# Patient Record
Sex: MALE | Race: WHITE | NOT HISPANIC OR LATINO | URBAN - METROPOLITAN AREA
[De-identification: names, ages, dates, MRNs, and addresses within clinical notes are randomized per-mention and may not be internally consistent; named-entity substitution may affect disease eponyms.]

---

## 2023-02-28 ENCOUNTER — APPOINTMENT (OUTPATIENT)
Dept: URBAN - METROPOLITAN AREA CLINIC 208 | Age: 33
Setting detail: DERMATOLOGY
End: 2023-03-03

## 2023-02-28 DIAGNOSIS — L29.1 PRURITUS SCROTI: ICD-10-CM

## 2023-02-28 PROCEDURE — OTHER PRESCRIPTION: OTHER

## 2023-02-28 PROCEDURE — OTHER ADDITIONAL NOTES: OTHER

## 2023-02-28 PROCEDURE — OTHER MIPS QUALITY: OTHER

## 2023-02-28 PROCEDURE — OTHER COUNSELING: OTHER

## 2023-02-28 PROCEDURE — 99204 OFFICE O/P NEW MOD 45 MIN: CPT

## 2023-02-28 RX ORDER — DOXYCYCLINE HYCLATE 100 MG/1
CAPSULE, GELATIN COATED ORAL
Qty: 60 | Refills: 0 | Status: ERX | COMMUNITY
Start: 2023-02-28

## 2023-02-28 RX ORDER — DOXEPIN HYDROCHLORIDE 10 MG/1
CAPSULE ORAL
Qty: 60 | Refills: 1 | Status: ERX | COMMUNITY
Start: 2023-02-28

## 2023-02-28 ASSESSMENT — LOCATION SIMPLE DESCRIPTION DERM: LOCATION SIMPLE: SCROTUM

## 2023-02-28 ASSESSMENT — LOCATION ZONE DERM: LOCATION ZONE: GENITALIA

## 2023-02-28 ASSESSMENT — LOCATION DETAILED DESCRIPTION DERM
LOCATION DETAILED: LEFT ANTERIOR SCROTUM
LOCATION DETAILED: RIGHT ANTERIOR SCROTUM

## 2023-02-28 NOTE — HPI: RASH
What Type Of Note Output Would You Prefer (Optional)?: Bullet Format
How Severe Is Your Rash?: mild
Is This A New Presentation, Or A Follow-Up?: Rash
Additional History: Pt states that he wore a new pair of shorts right before the rash started. Urgent care diagnosed it as jock itch, which Rx Terbinafine. Then Urologist put pt on Clotrimazole-Betamethasone. Have tried Ketoconazole and Hydrocortisone from another Dermatologist, which has caused another rash on inner thigh. Bx was done and confirm rash as eczema on the inner thigh and Rx Triamcinolone. None of these treatments have helped. PCP just Rx pt Nystatin powder but Pt hasn’t stated using because he wanted to wait to see us. Pt states that area gets worst when he sweats, so he stopped exercising. Pt states that he tries very hard to keep the area dry.Tried Triple paste has helped sensitivity but when stop using flares back up.

## 2023-02-28 NOTE — PROCEDURE: ADDITIONAL NOTES
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  Discussed topical compounded nerve medications.  Pt is having MRI and further workup for herniated disks in lower back, discussed that this could be the etiology for his symptoms.
Render Risk Assessment In Note?: no
Detail Level: Generalized

## 2023-03-30 ENCOUNTER — APPOINTMENT (OUTPATIENT)
Dept: URBAN - METROPOLITAN AREA CLINIC 208 | Age: 33
Setting detail: DERMATOLOGY
End: 2023-04-03

## 2023-03-30 DIAGNOSIS — L29.1 PRURITUS SCROTI: ICD-10-CM

## 2023-03-30 PROCEDURE — OTHER PRESCRIPTION MEDICATION MANAGEMENT: OTHER

## 2023-03-30 PROCEDURE — OTHER PRESCRIPTION: OTHER

## 2023-03-30 PROCEDURE — OTHER MIPS QUALITY: OTHER

## 2023-03-30 PROCEDURE — 99214 OFFICE O/P EST MOD 30 MIN: CPT

## 2023-03-30 PROCEDURE — OTHER COUNSELING: OTHER

## 2023-03-30 PROCEDURE — OTHER ADDITIONAL NOTES: OTHER

## 2023-03-30 RX ORDER — TACROLIMUS 1 MG/G
OINTMENT TOPICAL
Qty: 30 | Refills: 0 | Status: ERX | COMMUNITY
Start: 2023-03-30

## 2023-03-30 ASSESSMENT — LOCATION DETAILED DESCRIPTION DERM
LOCATION DETAILED: RIGHT ANTERIOR SCROTUM
LOCATION DETAILED: LEFT ANTERIOR SCROTUM

## 2023-03-30 ASSESSMENT — LOCATION SIMPLE DESCRIPTION DERM: LOCATION SIMPLE: SCROTUM

## 2023-03-30 ASSESSMENT — LOCATION ZONE DERM: LOCATION ZONE: GENITALIA

## 2023-03-30 NOTE — PROCEDURE: ADDITIONAL NOTES
Render Risk Assessment In Note?: no
Detail Level: Generalized
Additional Notes: Start: doxycycline 100mg twice daily (antiinflammatory) - with meals, with a large glass of water to wash down, do not lie down for 1 hr after dose\\n\\nConsider: doxepin 10mg, 2 pills at night - for symptomatic relief\\n\\nTopicals: \\nStart: Opzelura sample - twice daily to most symptomatic area, try for 2-3 weeks, if not helping, then start tacrolimus\\n\\nTacrolimus - twice daily, this medication has been known to burn so use with caution\\n\\nLidocaine topical (OTC) - can use 3-4 times daily for symptomatic relief

## 2023-03-30 NOTE — PROCEDURE: PRESCRIPTION MEDICATION MANAGEMENT
Samples Given: Opzelura samples given - recommended that he try this topical medication first.
Detail Level: Zone
Continue Regimen: Doxepin 10 mg capsule - Take 2 capsules by mouth at night.\\nDoxycycline hyclate 100 mg capsule - Take one capsule by mouth twice a day.
Render In Strict Bullet Format?: No
Initiate Treatment: Tacrolimus 0.1 % topical ointment - Apply to the affected area topically twice a day.

## 2023-04-18 ENCOUNTER — RX ONLY (RX ONLY)
Age: 33
End: 2023-04-18

## 2023-04-18 RX ORDER — DOXYCYCLINE HYCLATE 20 MG/1
TABLET, FILM COATED ORAL
Qty: 60 | Refills: 1 | Status: ERX | COMMUNITY
Start: 2023-04-18